# Patient Record
Sex: FEMALE | Race: WHITE | NOT HISPANIC OR LATINO | Employment: UNEMPLOYED | URBAN - METROPOLITAN AREA
[De-identification: names, ages, dates, MRNs, and addresses within clinical notes are randomized per-mention and may not be internally consistent; named-entity substitution may affect disease eponyms.]

---

## 2019-05-07 ENCOUNTER — TRANSCRIBE ORDERS (OUTPATIENT)
Dept: ADMINISTRATIVE | Facility: HOSPITAL | Age: 59
End: 2019-05-07

## 2019-05-07 DIAGNOSIS — G57.52 TARSAL TUNNEL SYNDROME OF LEFT SIDE: Primary | ICD-10-CM

## 2019-05-07 DIAGNOSIS — M72.2 PLANTAR FASCIAL FIBROMATOSIS: ICD-10-CM

## 2019-06-05 ENCOUNTER — HOSPITAL ENCOUNTER (OUTPATIENT)
Dept: NEUROLOGY | Facility: CLINIC | Age: 59
Discharge: HOME | End: 2019-06-05
Payer: COMMERCIAL

## 2019-06-05 DIAGNOSIS — G57.52 TARSAL TUNNEL SYNDROME OF LEFT SIDE: ICD-10-CM

## 2019-06-05 DIAGNOSIS — M72.2 PLANTAR FASCIAL FIBROMATOSIS: ICD-10-CM

## 2019-06-05 PROCEDURE — 95910 NRV CNDJ TEST 7-8 STUDIES: CPT | Performed by: PHYSICAL MEDICINE & REHABILITATION

## 2019-06-05 PROCEDURE — 95886 MUSC TEST DONE W/N TEST COMP: CPT | Performed by: PHYSICAL MEDICINE & REHABILITATION

## 2021-07-21 ENCOUNTER — APPOINTMENT (OUTPATIENT)
Dept: RADIOLOGY | Age: 61
End: 2021-07-21
Payer: MEDICARE

## 2021-07-21 DIAGNOSIS — F43.29 POST-TRAUMA RESPONSE: ICD-10-CM

## 2021-07-21 PROCEDURE — 73030 X-RAY EXAM OF SHOULDER: CPT

## 2022-04-20 ENCOUNTER — APPOINTMENT (OUTPATIENT)
Dept: LAB | Age: 62
End: 2022-04-20
Payer: MEDICARE

## 2022-04-28 RX ORDER — VITAMIN B COMPLEX
1 CAPSULE ORAL DAILY
COMMUNITY

## 2022-04-28 RX ORDER — MULTIVITAMIN
1 TABLET ORAL DAILY
COMMUNITY

## 2022-04-28 RX ORDER — FEXOFENADINE HCL 180 MG/1
180 TABLET ORAL DAILY
COMMUNITY

## 2022-04-28 NOTE — PRE-PROCEDURE INSTRUCTIONS
Pre-Surgery Instructions:   Medication Instructions    Ascorbic Acid (VITAMIN C PO) Stop taking 7 days prior to surgery   azelastine (ASTELIN) 0 1 % nasal spray Uses PRN- OK to take day of surgery    b complex vitamins capsule Stop taking 7 days prior to surgery   Cholecalciferol (Vitamin D3) 125 MCG (5000 UT) CAPS Stop taking 7 days prior to surgery   DULoxetine (CYMBALTA) 60 mg delayed release capsule Take day of surgery   Epinastine HCl 0 05 % ophthalmic solution Uses PRN- OK to take day of surgery    fexofenadine (ALLEGRA) 180 MG tablet Hold day of surgery   ipratropium (ATROVENT) 0 06 % nasal spray Uses PRN- OK to take day of surgery    levothyroxine 50 mcg tablet Take day of surgery   Multiple Vitamin (multivitamin) tablet Stop taking 7 days prior to surgery   zolpidem (AMBIEN) 10 mg tablet Hold day of surgery  Pt instructed to take levothyroxine and cymbalta the morning of surgery with a small sip of water  [facility] preop instructions reviewed with pt  Pt has surgical soap

## 2022-05-02 ENCOUNTER — ANESTHESIA EVENT (OUTPATIENT)
Dept: PERIOP | Facility: HOSPITAL | Age: 62
End: 2022-05-02
Payer: MEDICARE

## 2022-05-03 ENCOUNTER — HOSPITAL ENCOUNTER (OUTPATIENT)
Facility: HOSPITAL | Age: 62
Setting detail: OUTPATIENT SURGERY
Discharge: HOME | End: 2022-05-03
Attending: SURGERY | Admitting: SURGERY
Payer: MEDICARE

## 2022-05-03 ENCOUNTER — ANESTHESIA (OUTPATIENT)
Dept: PERIOP | Facility: HOSPITAL | Age: 62
End: 2022-05-03
Payer: MEDICARE

## 2022-05-03 VITALS
BODY MASS INDEX: 24.43 KG/M2 | RESPIRATION RATE: 18 BRPM | DIASTOLIC BLOOD PRESSURE: 88 MMHG | OXYGEN SATURATION: 92 % | TEMPERATURE: 97 F | HEIGHT: 66 IN | WEIGHT: 152 LBS | SYSTOLIC BLOOD PRESSURE: 131 MMHG | HEART RATE: 75 BPM

## 2022-05-03 PROBLEM — R11.2 PONV (POSTOPERATIVE NAUSEA AND VOMITING): Status: ACTIVE | Noted: 2022-05-03

## 2022-05-03 PROBLEM — Z98.890 PONV (POSTOPERATIVE NAUSEA AND VOMITING): Status: ACTIVE | Noted: 2022-05-03

## 2022-05-03 PROBLEM — F32.A DEPRESSION: Status: ACTIVE | Noted: 2022-05-03

## 2022-05-03 PROBLEM — E03.9 HYPOTHYROIDISM: Status: ACTIVE | Noted: 2022-05-03

## 2022-05-03 PROBLEM — C50.919 BREAST CANCER (HCC): Status: ACTIVE | Noted: 2022-05-03

## 2022-05-03 RX ORDER — CEFAZOLIN SODIUM 1 G/50ML
1000 SOLUTION INTRAVENOUS ONCE
Status: COMPLETED | OUTPATIENT
Start: 2022-05-03 | End: 2022-05-03

## 2022-05-03 RX ORDER — DEXAMETHASONE SODIUM PHOSPHATE 10 MG/ML
INJECTION, SOLUTION INTRAMUSCULAR; INTRAVENOUS AS NEEDED
Status: DISCONTINUED | OUTPATIENT
Start: 2022-05-03 | End: 2022-05-03

## 2022-05-03 RX ORDER — SODIUM CHLORIDE, SODIUM LACTATE, POTASSIUM CHLORIDE, CALCIUM CHLORIDE 600; 310; 30; 20 MG/100ML; MG/100ML; MG/100ML; MG/100ML
100 INJECTION, SOLUTION INTRAVENOUS CONTINUOUS
Status: DISCONTINUED | OUTPATIENT
Start: 2022-05-03 | End: 2022-05-03 | Stop reason: HOSPADM

## 2022-05-03 RX ORDER — LIDOCAINE HYDROCHLORIDE AND EPINEPHRINE 5; 5 MG/ML; UG/ML
INJECTION, SOLUTION INFILTRATION; PERINEURAL AS NEEDED
Status: DISCONTINUED | OUTPATIENT
Start: 2022-05-03 | End: 2022-05-03 | Stop reason: HOSPADM

## 2022-05-03 RX ORDER — ONDANSETRON 2 MG/ML
INJECTION INTRAMUSCULAR; INTRAVENOUS AS NEEDED
Status: DISCONTINUED | OUTPATIENT
Start: 2022-05-03 | End: 2022-05-03

## 2022-05-03 RX ORDER — MAGNESIUM HYDROXIDE 1200 MG/15ML
LIQUID ORAL AS NEEDED
Status: DISCONTINUED | OUTPATIENT
Start: 2022-05-03 | End: 2022-05-03 | Stop reason: HOSPADM

## 2022-05-03 RX ORDER — PROPOFOL 10 MG/ML
INJECTION, EMULSION INTRAVENOUS AS NEEDED
Status: DISCONTINUED | OUTPATIENT
Start: 2022-05-03 | End: 2022-05-03

## 2022-05-03 RX ORDER — MIDAZOLAM HYDROCHLORIDE 2 MG/2ML
INJECTION, SOLUTION INTRAMUSCULAR; INTRAVENOUS AS NEEDED
Status: DISCONTINUED | OUTPATIENT
Start: 2022-05-03 | End: 2022-05-03

## 2022-05-03 RX ORDER — FENTANYL CITRATE/PF 50 MCG/ML
25 SYRINGE (ML) INJECTION
Status: DISCONTINUED | OUTPATIENT
Start: 2022-05-03 | End: 2022-05-03 | Stop reason: HOSPADM

## 2022-05-03 RX ORDER — LIDOCAINE HYDROCHLORIDE 10 MG/ML
0.5 INJECTION, SOLUTION EPIDURAL; INFILTRATION; INTRACAUDAL; PERINEURAL ONCE AS NEEDED
Status: DISCONTINUED | OUTPATIENT
Start: 2022-05-03 | End: 2022-05-03 | Stop reason: HOSPADM

## 2022-05-03 RX ORDER — MINERAL OIL AND PETROLATUM 150; 830 MG/G; MG/G
OINTMENT OPHTHALMIC AS NEEDED
Status: DISCONTINUED | OUTPATIENT
Start: 2022-05-03 | End: 2022-05-03 | Stop reason: HOSPADM

## 2022-05-03 RX ORDER — ACETAMINOPHEN 325 MG/1
650 TABLET ORAL EVERY 6 HOURS PRN
Status: DISCONTINUED | OUTPATIENT
Start: 2022-05-03 | End: 2022-05-03 | Stop reason: HOSPADM

## 2022-05-03 RX ORDER — SODIUM CHLORIDE, SODIUM LACTATE, POTASSIUM CHLORIDE, CALCIUM CHLORIDE 600; 310; 30; 20 MG/100ML; MG/100ML; MG/100ML; MG/100ML
INJECTION, SOLUTION INTRAVENOUS CONTINUOUS PRN
Status: DISCONTINUED | OUTPATIENT
Start: 2022-05-03 | End: 2022-05-03

## 2022-05-03 RX ORDER — ROCURONIUM BROMIDE 10 MG/ML
INJECTION, SOLUTION INTRAVENOUS AS NEEDED
Status: DISCONTINUED | OUTPATIENT
Start: 2022-05-03 | End: 2022-05-03

## 2022-05-03 RX ORDER — FENTANYL CITRATE 50 UG/ML
INJECTION, SOLUTION INTRAMUSCULAR; INTRAVENOUS AS NEEDED
Status: DISCONTINUED | OUTPATIENT
Start: 2022-05-03 | End: 2022-05-03

## 2022-05-03 RX ORDER — NEOMYCIN SULFATE, POLYMYXIN B SULFATE, AND DEXAMETHASONE 3.5; 10000; 1 MG/G; [USP'U]/G; MG/G
OINTMENT OPHTHALMIC AS NEEDED
Status: DISCONTINUED | OUTPATIENT
Start: 2022-05-03 | End: 2022-05-03 | Stop reason: HOSPADM

## 2022-05-03 RX ORDER — ONDANSETRON 2 MG/ML
4 INJECTION INTRAMUSCULAR; INTRAVENOUS ONCE AS NEEDED
Status: DISCONTINUED | OUTPATIENT
Start: 2022-05-03 | End: 2022-05-03 | Stop reason: HOSPADM

## 2022-05-03 RX ADMIN — SODIUM CHLORIDE, SODIUM LACTATE, POTASSIUM CHLORIDE, AND CALCIUM CHLORIDE: .6; .31; .03; .02 INJECTION, SOLUTION INTRAVENOUS at 14:04

## 2022-05-03 RX ADMIN — FENTANYL CITRATE 50 MCG: 50 INJECTION, SOLUTION INTRAMUSCULAR; INTRAVENOUS at 13:54

## 2022-05-03 RX ADMIN — DEXAMETHASONE SODIUM PHOSPHATE 10 MG: 10 INJECTION, SOLUTION INTRAMUSCULAR; INTRAVENOUS at 14:13

## 2022-05-03 RX ADMIN — FENTANYL CITRATE 25 MCG: 50 INJECTION, SOLUTION INTRAMUSCULAR; INTRAVENOUS at 15:21

## 2022-05-03 RX ADMIN — ACETAMINOPHEN 650 MG: 325 TABLET ORAL at 16:01

## 2022-05-03 RX ADMIN — ONDANSETRON 4 MG: 2 INJECTION INTRAMUSCULAR; INTRAVENOUS at 14:13

## 2022-05-03 RX ADMIN — MIDAZOLAM 2 MG: 1 INJECTION INTRAMUSCULAR; INTRAVENOUS at 14:03

## 2022-05-03 RX ADMIN — FENTANYL CITRATE 25 MCG: 50 INJECTION, SOLUTION INTRAMUSCULAR; INTRAVENOUS at 15:26

## 2022-05-03 RX ADMIN — CEFAZOLIN SODIUM 1000 MG: 1 SOLUTION INTRAVENOUS at 14:04

## 2022-05-03 RX ADMIN — FENTANYL CITRATE 50 MCG: 50 INJECTION, SOLUTION INTRAMUSCULAR; INTRAVENOUS at 14:08

## 2022-05-03 RX ADMIN — SUGAMMADEX 200 MG: 100 INJECTION, SOLUTION INTRAVENOUS at 14:55

## 2022-05-03 RX ADMIN — PROPOFOL 200 MG: 10 INJECTION, EMULSION INTRAVENOUS at 14:08

## 2022-05-03 RX ADMIN — ROCURONIUM BROMIDE 40 MG: 10 INJECTION, SOLUTION INTRAVENOUS at 14:08

## 2022-05-03 NOTE — ANESTHESIA POSTPROCEDURE EVALUATION
Post-Op Assessment Note    CV Status:  Stable  Pain Score: 0    Pain management: adequate     Mental Status:  Alert and awake   Hydration Status:  Euvolemic   PONV Controlled:  Controlled   Airway Patency:  Patent      Post Op Vitals Reviewed: Yes            No complications documented      BP   112/70   Temp   100   Pulse  74   Resp   18   SpO2   93

## 2022-05-03 NOTE — DISCHARGE SUMMARY
PLASTIC, RECONSTRUCTIVE, & HAND SURGERY   Discharge Summary  Date of Admission:   5/3/2022  Date of Discharge:   05/03/22  Attending:  Yulissa Villaseñor MD  Principal/Final Diagnosis:   Blepharochalasis right upper eyelid [H02 31]  Blepharochalasis left upper eyelid [H02 34]  Principal Procedure:   BLEPHAROPLASTY UPPER (Bilateral Eye)  Discharge Medications:  See after visit summary for reconciled discharge medications provided to patient and family  Reason for Admission:  Desmond Rogel was electively admitted to undergo the above named procedure on 5/3/2022 as an outpatient  Hospital Course:  Patient underwent the above named procedure on the day of admission without complications  They were discharged home the same day  Disposition:  To home in care of self and family    Condition:  Good  Follow up:  Patient with follow up in the office with Dr Yulissa Villaseñor MD in 1 week(s) or as scheduled per his office  Yulissa Villaseñor MD  5/3/2022 3:01 PM

## 2022-05-03 NOTE — ANESTHESIA PREPROCEDURE EVALUATION
Procedure:  BLEPHAROPLASTY UPPER (Bilateral Eye)    Relevant Problems   ANESTHESIA   (+) PONV (postoperative nausea and vomiting)      ENDO   (+) Hypothyroidism      GYN   (+) Breast cancer (HCC)      NEURO/PSYCH   (+) Depression        Physical Exam    Airway    Mallampati score: I  TM Distance: >3 FB  Neck ROM: full     Dental   Comment: Permanent retainer,     Cardiovascular      Pulmonary      Other Findings        Anesthesia Plan  ASA Score- 2     Anesthesia Type- general with ASA Monitors  Additional Monitors:   Airway Plan: ETT  Plan Factors-Exercise tolerance (METS): >4 METS  Chart reviewed  Patient summary reviewed  Patient is not a current smoker  Patient did not smoke on day of surgery  Induction- intravenous  Postoperative Plan- Plan for postoperative opioid use  Planned trial extubation    Informed Consent- Anesthetic plan and risks discussed with patient  I personally reviewed this patient with the CRNA  Discussed and agreed on the Anesthesia Plan with the CRNA  Radha Herr

## 2022-05-03 NOTE — OP NOTE
OPERATIVE REPORT  PATIENT NAME: Margarito Dotson    :  1960  MRN: [id number]  Pt Location:  OR ROOM 08    SURGERY DATE: 5/3/2022    Surgeon(s) and Role:     * Jacky Avendaño MD - Primary    Preop Diagnosis:  Blepharochalasis right upper eyelid [H02 31]  Blepharochalasis left upper eyelid [H02 34]    Post-Op Diagnosis Codes: * Blepharochalasis right upper eyelid [H02 31]     * Blepharochalasis left upper eyelid [H02 34]    Procedure(s) (LRB):  BLEPHAROPLASTY UPPER (Bilateral)    Specimen(s):  * No specimens in log *    Estimated Blood Loss:   Minimal    Drains:  * No LDAs found *    Anesthesia Type:   General    Operative Indications:  Blepharochalasis right upper eyelid [H02 31]  Blepharochalasis left upper eyelid [H02 34]      Operative Findings:      Complications:   None    Procedure and Technique:  The patient was properly identified in the operating room and placed    in the supine position on the bed  She was prepped and draped in the    sterile surgical fashion  We first started by marking out the eyes with gentian violet and then    went ahead and incised the injected skin with 1% lidocaine with    epinephrine  At this point, we then went ahead and excised the skin    with a #15 blade and electrocautery  We obtained meticulous    hemostasis  We removed a strip of orbicularis oculi muscle along the    tarsal edge  We then went ahead and removed  fat from    the middle and medial fat pads of the upper eyes bilaterally  At this    point, we obtained meticulous hemostasis and closed the upper eyes    with deep 6-0 Vicryl and 6-0 Prolene suture  The  patient tolerated procedure well and was awakened from surgery,    dressed with antibiotic ointment and taken to the recovery room in    stable condition         I was present for the entire procedure    Patient Disposition:  PACU       SIGNATURE: Jacky Avendaño MD  DATE: May 3, 2022  TIME: 2:57 PM

## 2022-05-03 NOTE — INTERVAL H&P NOTE
H&P reviewed  After examining the patient I find no changes in the patients condition since the H&P had been written      Vitals:    05/03/22 1231   BP: 131/83   Pulse: 75   Resp: 20   Temp: 97 7 °F (36 5 °C)   SpO2: 98%

## 2023-12-14 PROBLEM — D72.819 CHRONIC LEUKOPENIA: Status: ACTIVE | Noted: 2023-12-14

## 2023-12-14 PROBLEM — M81.0 SENILE OSTEOPOROSIS: Status: ACTIVE | Noted: 2023-12-14

## 2023-12-14 PROBLEM — G89.4 CHRONIC PAIN SYNDROME: Status: ACTIVE | Noted: 2023-12-14

## 2023-12-14 PROBLEM — G56.01 CARPAL TUNNEL SYNDROME OF RIGHT WRIST: Status: ACTIVE | Noted: 2023-12-14

## 2023-12-14 PROBLEM — N18.2 STAGE 2 CHRONIC KIDNEY DISEASE: Status: ACTIVE | Noted: 2023-12-14

## 2023-12-14 PROBLEM — M35.9 UNDIFFERENTIATED CONNECTIVE TISSUE DISEASE (HCC): Status: ACTIVE | Noted: 2023-12-14

## 2023-12-14 PROBLEM — M15.0 PRIMARY GENERALIZED (OSTEO)ARTHRITIS: Status: ACTIVE | Noted: 2023-12-14

## 2024-01-15 ENCOUNTER — TELEPHONE (OUTPATIENT)
Age: 64
End: 2024-01-15

## 2024-01-15 NOTE — TELEPHONE ENCOUNTER
----- Message from Clau Don sent at 1/12/2024 12:40 PM EST -----    ----- Message -----  From: Radha Guajardo MD  Sent: 12/23/2023  12:06 PM EST  To: Clau Don    Notify the patient that her muscle enzyme is very mildly elevated.  Will put in epic repeat muscle enzyme draw.  She can access it for [facility] but if she wants to go to [facility], we can fax it.

## 2024-01-24 PROBLEM — M77.8 RIGHT ELBOW TENDONITIS: Status: ACTIVE | Noted: 2024-01-24

## 2024-01-24 PROBLEM — G89.29 CHRONIC MIDLINE LOW BACK PAIN WITHOUT SCIATICA: Status: ACTIVE | Noted: 2024-01-24

## 2024-01-24 PROBLEM — M54.50 CHRONIC MIDLINE LOW BACK PAIN WITHOUT SCIATICA: Status: ACTIVE | Noted: 2024-01-24

## 2024-02-10 ENCOUNTER — APPOINTMENT (OUTPATIENT)
Dept: RADIOLOGY | Age: 64
End: 2024-02-10
Payer: MEDICARE

## 2024-02-10 DIAGNOSIS — M54.50 CHRONIC MIDLINE LOW BACK PAIN WITHOUT SCIATICA: ICD-10-CM

## 2024-02-10 DIAGNOSIS — M15.0 PRIMARY GENERALIZED (OSTEO)ARTHRITIS: ICD-10-CM

## 2024-02-10 DIAGNOSIS — G89.29 CHRONIC MIDLINE LOW BACK PAIN WITHOUT SCIATICA: ICD-10-CM

## 2024-02-10 PROCEDURE — 73630 X-RAY EXAM OF FOOT: CPT

## 2024-02-10 PROCEDURE — 73130 X-RAY EXAM OF HAND: CPT

## 2024-02-10 PROCEDURE — 72110 X-RAY EXAM L-2 SPINE 4/>VWS: CPT

## 2024-04-18 ENCOUNTER — TELEPHONE (OUTPATIENT)
Age: 64
End: 2024-04-18

## 2024-04-29 ENCOUNTER — TELEPHONE (OUTPATIENT)
Age: 64
End: 2024-04-29

## 2024-05-20 ENCOUNTER — OFFICE VISIT (OUTPATIENT)
Age: 64
End: 2024-05-20
Payer: MEDICARE

## 2024-05-20 VITALS
HEIGHT: 64 IN | HEART RATE: 68 BPM | WEIGHT: 132.8 LBS | OXYGEN SATURATION: 98 % | TEMPERATURE: 97.7 F | DIASTOLIC BLOOD PRESSURE: 84 MMHG | SYSTOLIC BLOOD PRESSURE: 120 MMHG | BODY MASS INDEX: 22.67 KG/M2

## 2024-05-20 DIAGNOSIS — E03.8 HYPOTHYROIDISM DUE TO HASHIMOTO'S THYROIDITIS: ICD-10-CM

## 2024-05-20 DIAGNOSIS — M15.0 PRIMARY GENERALIZED (OSTEO)ARTHRITIS: ICD-10-CM

## 2024-05-20 DIAGNOSIS — E06.3 HYPOTHYROIDISM DUE TO HASHIMOTO'S THYROIDITIS: ICD-10-CM

## 2024-05-20 DIAGNOSIS — D72.819 CHRONIC LEUKOPENIA: ICD-10-CM

## 2024-05-20 DIAGNOSIS — N18.2 STAGE 2 CHRONIC KIDNEY DISEASE: ICD-10-CM

## 2024-05-20 DIAGNOSIS — G56.01 CARPAL TUNNEL SYNDROME OF RIGHT WRIST: ICD-10-CM

## 2024-05-20 DIAGNOSIS — M81.0 SENILE OSTEOPOROSIS: ICD-10-CM

## 2024-05-20 DIAGNOSIS — M35.9 UNDIFFERENTIATED CONNECTIVE TISSUE DISEASE (HCC): Primary | ICD-10-CM

## 2024-05-20 PROCEDURE — 99214 OFFICE O/P EST MOD 30 MIN: CPT | Performed by: INTERNAL MEDICINE

## 2024-05-20 NOTE — PROGRESS NOTES
Assessment/Plan:    Undifferentiated connective tissue disease (HCC)  Lupus Avise with borderline positive index likely secondary to Sjogren's syndrome with sicca sx's with positive anti-Ro 52 probably secondary to autoimmune thyroid disease with positive thyroglobulin antibody.  No evidence clinically for lupus with negative double-stranded DNA antibody and negative complement activation.  She does have Raynaud's with no evidence for digital ulcerations.  Encourage cold protection for Raynaud's.  Encourage patient to be consistent with the anti-inflammatory diet in view of potential benefit.  She does have a book reference for this.  Follow-up 6 months or sooner if needed.  Continue to monitor patient clinically and with lab work as ordered.    Chronic leukopenia  Chronic leukopenia with no history of recurrent fevers or infections.  Doubt the leukopenia is clinically significant.  The most recent white blood cell count dated 5/10/2024 was 4.9 with absolute neutrophil count 2.6.  Continue to monitor with lab work as ordered.    Primary generalized (osteo)arthritis  Primary generalized osteoarthritis with interphalangeal osteoarthritis hands and feet, right shoulder acromioclavicular osteoarthritis, with lumbar spine films from February of this year totally unremarkable.  Patient is status post right carpal tunnel release and decompression right ulnar neuropathy in March of this year.  No evidence for inflammatory arthropathy.  Encourage home exercise program as tolerated.  Continue to monitor.    Stage 2 chronic kidney disease  Lab Results   Component Value Date    EGFR 65 05/10/2024    EGFR 60 02/08/2024    EGFR 61 12/18/2023    CREATININE 0.98 05/10/2024    CREATININE 1.05 02/08/2024    CREATININE 1.03 12/18/2023   Chronic kidney disease stage II with the most recent estimated GFR 65 dated 5/10/2024.  Continue to monitor particularly in view of meloxicam usage.    Senile osteoporosis  Osteoporosis being treated  with Prolia by hematology oncology, with most recent DEXA from July 2022 significant for osteoporosis with decrease in BMD as compared to the prior study from 2016.  Patient will be due for updated DEXA at the end of July or early August.  I did give her a prescription for this.  Monitor BMD every 2 years.  Continue calcium and vitamin D supplement.  Continue home exercise program as tolerated.  Continue to monitor.    Carpal tunnel syndrome of right wrist  Status post carpal tunnel release and decompression right ulnar nerve March 2024 with success.  Follow-up hand surgeon.    Hypothyroidism  Continue levothyroxine per primary care and will monitor thyroid function studies.  Patient does have evidence of autoimmune thyroid disease with positive thyroglobulin antibody and probable secondary Sjogren's syndrome with chronic sicca symptoms.  Continue symptomatic treatment for chronic dry eyes and dry mouth.  Encourage vigilant dental hygiene and follow-up with dentist every 6 months or sooner if needed, particularly in view of her history of a cracked tooth.  Continue to monitor.         Problem List Items Addressed This Visit       Hypothyroidism     Continue levothyroxine per primary care and will monitor thyroid function studies.  Patient does have evidence of autoimmune thyroid disease with positive thyroglobulin antibody and probable secondary Sjogren's syndrome with chronic sicca symptoms.  Continue symptomatic treatment for chronic dry eyes and dry mouth.  Encourage vigilant dental hygiene and follow-up with dentist every 6 months or sooner if needed, particularly in view of her history of a cracked tooth.  Continue to monitor.         Relevant Orders    DXA bone density spine hip and pelvis    C-reactive protein    CBC and differential    Comprehensive metabolic panel    dsDNA Antibody by IFA, Crithidia luciliae, with Reflex to Titer    C3 complement    C4 complement    Urinalysis with microscopic    Senile  osteoporosis     Osteoporosis being treated with Prolia by hematology oncology, with most recent DEXA from July 2022 significant for osteoporosis with decrease in BMD as compared to the prior study from 2016.  Patient will be due for updated DEXA at the end of July or early August.  I did give her a prescription for this.  Monitor BMD every 2 years.  Continue calcium and vitamin D supplement.  Continue home exercise program as tolerated.  Continue to monitor.         Relevant Orders    DXA bone density spine hip and pelvis    Primary generalized (osteo)arthritis     Primary generalized osteoarthritis with interphalangeal osteoarthritis hands and feet, right shoulder acromioclavicular osteoarthritis, with lumbar spine films from February of this year totally unremarkable.  Patient is status post right carpal tunnel release and decompression right ulnar neuropathy in March of this year.  No evidence for inflammatory arthropathy.  Encourage home exercise program as tolerated.  Continue to monitor.         Carpal tunnel syndrome of right wrist     Status post carpal tunnel release and decompression right ulnar nerve March 2024 with success.  Follow-up hand surgeon.         Undifferentiated connective tissue disease (HCC) - Primary     Lupus Avise with borderline positive index likely secondary to Sjogren's syndrome with sicca sx's with positive anti-Ro 52 probably secondary to autoimmune thyroid disease with positive thyroglobulin antibody.  No evidence clinically for lupus with negative double-stranded DNA antibody and negative complement activation.  She does have Raynaud's with no evidence for digital ulcerations.  Encourage cold protection for Raynaud's.  Encourage patient to be consistent with the anti-inflammatory diet in view of potential benefit.  She does have a book reference for this.  Follow-up 6 months or sooner if needed.  Continue to monitor patient clinically and with lab work as ordered.          Relevant Orders    DXA bone density spine hip and pelvis    C-reactive protein    CBC and differential    Comprehensive metabolic panel    dsDNA Antibody by IFA, Crithidia luciliae, with Reflex to Titer    C3 complement    C4 complement    Urinalysis with microscopic    Chronic leukopenia     Chronic leukopenia with no history of recurrent fevers or infections.  Doubt the leukopenia is clinically significant.  The most recent white blood cell count dated 5/10/2024 was 4.9 with absolute neutrophil count 2.6.  Continue to monitor with lab work as ordered.         Stage 2 chronic kidney disease     Lab Results   Component Value Date    EGFR 65 05/10/2024    EGFR 60 02/08/2024    EGFR 61 12/18/2023    CREATININE 0.98 05/10/2024    CREATININE 1.05 02/08/2024    CREATININE 1.03 12/18/2023   Chronic kidney disease stage II with the most recent estimated GFR 65 dated 5/10/2024.  Continue to monitor particularly in view of meloxicam usage.                Reviewed records, labs, and imaging with the patient in detail.  Counseled patient.  Discussion regarding my findings and recommendations.  Office visit with documentation 35 min.    Subjective:      Patient ID: Koki Gillis is a 63 y.o. female.    63-year-old female with osteoporosis, who recently received her 4th dose of Prolia 5/14/2024 per oncology, with history of stage I triple negative breast cancer treated with bilateral mastectomies and chemotherapy.  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  She is followed by oncology for surveillance of breast cancer, and has history of chronic leukopenia.  She has history of osteoarthritis with joint complaints in shoulders, knees, feet, neck for approximately 5 years.  She complains of foot arthralgias with walking.  She has chronic low back pain nonradicular in nature.  She has had right elbow arthralgias and had an MRI dated 4/4/2023 was significant for mild to moderate biceps tendinosis with  subacute to chronic partial tears in addition to mild tendinosis of the common extensor and flexor origin tendons, and mild triceps tendinosis.  She has morning stiffness of minutes duration with no joint swelling.  She does complain of arthralgias in particular DIPs of left hand and first CMC of right hand.  She states that she sleeps okay, however, is not refreshed in the morning and notes low energy. She has persistent fatigue. She denies true 2 color Raynaud's phenomenon but does note cold sensitivity.  She has a dry mouth in the morning and gives history of a cracked tooth/dental decay.  She does have seasonal allergies treated symptomatically.  She had tried the anti-inflammatory diet when she was away but states that she felt worse on the diet, however, she is on the anti-inflammatory diet approximately 10 days ago.  She has history of right carpal tunnel release and decompression right ulnar neuropathy in March of this year. She has history of anxiety and depression.  She does give history of contact dermatitis from grass in [place].  Review of systems is otherwise remarkable for sinus headaches, urinary stress incontinence, and muscle cramps.  Patient states she was diagnosed with Sjogren's based on lab work dated 10/11/2022 with CAM positive in a dilution of 320 in a cytoplasmic pattern with positive SSA antibodies.  Pritchard/RNP, scleroderma antibodies, and double-stranded DNA antibodies all negative.  Rheumatoid factor negative.  Lyme antibody negative.  CRP less than 3.0.     Lab work dated 5/10/2024 significant for white blood cell count 4.9 with absolute neutrophil count 2.6.  Calcium 10.1.  Estimated GFR 65.  Vitamin D 67.  Lupus Avise diagnostic testing dated 12/18/2023 significant for index of +0.2 with CAM positive by IgG and negative by hep 2 antibody.  Anti-Ro 52 antibody positive as well as thyroglobulin antibody.  TPO antibody negative.  Additional lab work dated 12/18/2023 significant for  rheumatoid factor negative.  CCP negative.  Lyme antibody negative.  CK elevated at 305.  CRP 1.2.  White blood cell count 4.1 with absolute neutrophil count 1.7.  Creatinine 1.03 with estimated GFR 61.  Vitamin D 37.  Lab work dated 2/9/2024 showed improvement of CK at 257.  CBC unremarkable.  Glucose 100.  Creatinine 1.05 with estimated GFR 60.  Aldolase normal at 3.8.    X-rays dated 2/10/2024 significant for bilateral hands with osteoarthritis.  Feet mild osteoarthritis left foot.  LS-spine films unremarkable.    DEXA dated 7/28/2022 compared to 2016 significant for spine T-score -1.9 with a decrease of 4.4% as compared to the prior study.  Right total hip T-score -1.5 with a decrease of 6.5% as compared to the prior study.  Femoral neck T-score -2.5 with a decrease of 8% as compared to the prior study.        Allergies  Allergies   Allergen Reactions    Oxycodone Other (See Comments)     generic percocet-rash    Bee Venom Swelling    Hydrocodone Rash    Oxycodone-Acetaminophen Rash       Home Medications    Current Outpatient Medications:     Calcium-Magnesium-Vitamin D (CALCIUM 1200+D3 PO), Take 1,200 mg by mouth daily, Disp: , Rfl:     cetirizine (ZyrTEC) 10 mg tablet, Take 10 mg by mouth daily, Disp: , Rfl:     DULoxetine (CYMBALTA) 60 mg delayed release capsule, Take 60 mg by mouth daily, Disp: , Rfl:     Epinastine HCl 0.05 % ophthalmic solution, Administer 1 drop to both eyes 2 (two) times a day, Disp: 5 mL, Rfl: 5    levothyroxine 75 mcg tablet, Take 75 mcg by mouth daily, Disp: , Rfl:     meloxicam (MOBIC) 15 mg tablet, Take 1 tablet by mouth daily, Disp: , Rfl:     Turmeric (QC Tumeric Complex) 500 MG CAPS, Take by mouth, Disp: , Rfl:     zolpidem (AMBIEN) 10 mg tablet, Take 10 mg by mouth as needed, Disp: , Rfl:     EPINEPHrine (EPIPEN) 0.3 mg/0.3 mL SOAJ, Inject 0.3 mL (0.3 mg total) into a muscle once for 1 dose, Disp: 0.6 each, Rfl: 3    Past Medical History  Past Medical History:   Diagnosis Date     Allergic rhinitis     Arthritis     Cancer (HCC)     breast cancer b/l, skin cancer    Claustrophobia     Depression     Disease of thyroid gland     hypo    Hashimoto's thyroiditis     Infectious viral hepatitis     A    Lupus (systemic lupus erythematosus) (HCC)     PONV (postoperative nausea and vomiting)     Seasonal allergies     Sjogren syndrome, unspecified (HCC)        Past Surgical History   Past Surgical History:   Procedure Laterality Date    BREAST RECONSTRUCTION Bilateral     CARPAL TUNNEL RELEASE Right 03/2024    COLONOSCOPY      FOOT SURGERY Left     HYSTERECTOMY      MASTECTOMY Bilateral 1998/2003    CT BLEPHAROPLASTY UPPER EYELID W/EXCESSIVE SKIN Bilateral 05/03/2022    Procedure: BLEPHAROPLASTY UPPER;  Surgeon: Trino Ridley MD;  Location:  MAIN OR;  Service: Plastics    TENDON REPAIR Left     arm       Family History   Family History   Problem Relation Age of Onset    Asthma Father     Allergic rhinitis Father     Asthma Sister     Allergic rhinitis Mother     Cancer Mother        The following portions of the patient's history were reviewed and updated as appropriate: allergies, current medications, past family history, past medical history, past social history, past surgical history, and problem list.    Review of Systems   Constitutional:  Negative for chills and fever.   HENT:  Positive for postnasal drip. Negative for ear pain and sore throat.         Dry mouth   Eyes:  Negative for pain and visual disturbance.        Occas dry eyes, using eye drops for allergies   Respiratory:  Negative for cough and shortness of breath.    Cardiovascular:  Negative for chest pain and palpitations.   Gastrointestinal:  Negative for abdominal pain and vomiting.   Genitourinary:  Negative for dysuria and hematuria.        Urinary stress incontinence   Musculoskeletal:  Positive for arthralgias, back pain (Non radicular LBP) and myalgias.        Leg cramps   Skin:  Positive for rash (Contact dermatitis  "from grass in [place]). Negative for color change.   Neurological:  Positive for headaches (sinus h/a). Negative for seizures and syncope.   All other systems reviewed and are negative.        Objective:      /84 (BP Location: Right arm, Patient Position: Sitting, Cuff Size: Adult)   Pulse 68   Temp 97.7 °F (36.5 °C) (Temporal)   Ht 5' 3.78\" (1.62 m)   Wt 60.2 kg (132 lb 12.8 oz)   SpO2 98%   BMI 22.95 kg/m²          Physical Exam  Vitals reviewed.   Constitutional:       Appearance: Normal appearance.   HENT:      Head: Normocephalic.      Nose:      Comments: Nose and throat unremarkable.  Eyes:      Extraocular Movements: Extraocular movements intact.      Comments: Scleral injection bilaterally.   Neck:      Comments: Without masses, thyromegaly, lymphadenopathy  Cardiovascular:      Rate and Rhythm: Regular rhythm.   Pulmonary:      Breath sounds: Normal breath sounds.   Abdominal:      Palpations: Abdomen is soft.   Musculoskeletal:      Cervical back: Neck supple.      Comments: Neck mildly decreased lateral flexion with spasm posterior cervical and shoulder muscles.  Shoulders essentially full range of motion.  Elbows full range of motion with tenderness right medial and lateral epicondyles.  No synovitis noted.  Wrists full range of motion.  Tinel's negative bilaterally.  Hands squaring first carpometacarpal joints bilaterally with early Heberden's nodes.  Questionable early Dupuytren's contracture right hand.  No synovitis noted.  Straight leg raising negative bilaterally.  Schober's negative.  Marked spasm lower lumbosacral paraspinals with decreased forward flexion.  No SI joint tenderness appreciated.  Hips full range of motion.  Knees full range of motion with patellofemoral crepitus.  Ankles full range of motion.  Feet rearfoot hyperpronation with midfoot cavus deformities, high instep's, hallux valgus deformities, hammertoe deformities.  No metatarsalgia appreciated.  No synovitis " noted.   Skin:     General: Skin is warm.      Comments: Raynaud's fingers and toes with no appreciable ulcerations.  No dilated nailfold capillary loops.   Neurological:      Comments: Slight decreased sensation right foot stocking distribution.               This note was written in part using the assistance of the [employer] [id number] microphone system. Those portions using this system have been dictated and not read.

## 2024-05-20 NOTE — PATIENT INSTRUCTIONS
Your blood work confirms that you have autoimmune thyroid disease with secondary Sjogren's, with no systemic manifestations.  There is no evidence of lupus.  You do not need systemic medication.  I would recommend that if you have dry eyes that you use Systane eyedrops.  I would also recommend that you see the dentist at least every 6 months, and ask if you need to come more frequently in view of your dry mouth which puts you at risk for dental decay.  Continue the anti-inflammatory diet.  I suspect that you will notice each month that you feel better.  Take your Zyrtec at night because it is a drug that makes you drowsy.  I would also recommend that you start an active exercise program with walking at least 3-4 times weekly.  Follow-up with primary care and hematology.  Continue the Prolia every 6 months.  I gave you a prescription to repeat your bone density for the end of July or early August.

## 2024-05-26 NOTE — ASSESSMENT & PLAN NOTE
Status post carpal tunnel release and decompression right ulnar nerve March 2024 with success.  Follow-up hand surgeon.

## 2024-05-26 NOTE — ASSESSMENT & PLAN NOTE
Continue levothyroxine per primary care and will monitor thyroid function studies.  Patient does have evidence of autoimmune thyroid disease with positive thyroglobulin antibody and probable secondary Sjogren's syndrome with chronic sicca symptoms.  Continue symptomatic treatment for chronic dry eyes and dry mouth.  Encourage vigilant dental hygiene and follow-up with dentist every 6 months or sooner if needed, particularly in view of her history of a cracked tooth.  Continue to monitor.

## 2024-05-26 NOTE — ASSESSMENT & PLAN NOTE
Chronic leukopenia with no history of recurrent fevers or infections.  Doubt the leukopenia is clinically significant.  The most recent white blood cell count dated 5/10/2024 was 4.9 with absolute neutrophil count 2.6.  Continue to monitor with lab work as ordered.

## 2024-05-26 NOTE — ASSESSMENT & PLAN NOTE
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  Patient is status post right carpal tunnel release and decompression right ulnar neuropathy in March of this year.  No evidence for inflammatory arthropathy.  Encourage home exercise program as tolerated.  Continue to monitor.

## 2024-05-26 NOTE — ASSESSMENT & PLAN NOTE
Lupus Avise with borderline positive index likely secondary to Sjogren's syndrome with sicca sx's with positive anti-Ro 52 probably secondary to autoimmune thyroid disease with positive thyroglobulin antibody.  No evidence clinically for lupus with negative double-stranded DNA antibody and negative complement activation.  She does have Raynaud's with no evidence for digital ulcerations.  Encourage cold protection for Raynaud's.  Encourage patient to be consistent with the anti-inflammatory diet in view of potential benefit.  She does have a book reference for this.  Follow-up 6 months or sooner if needed.  Continue to monitor patient clinically and with lab work as ordered.

## 2024-05-26 NOTE — ASSESSMENT & PLAN NOTE
Osteoporosis being treated with Prolia by hematology oncology, with most recent DEXA from July 2022 significant for osteoporosis with decrease in BMD as compared to the prior study from 2016.  Patient will be due for updated DEXA at the end of July or early August.  I did give her a prescription for this.  Monitor BMD every 2 years.  Continue calcium and vitamin D supplement.  Continue home exercise program as tolerated.  Continue to monitor.

## 2024-08-01 ENCOUNTER — HOSPITAL ENCOUNTER (OUTPATIENT)
Dept: RADIOLOGY | Facility: IMAGING CENTER | Age: 64
End: 2024-08-01
Payer: MEDICARE

## 2024-08-01 DIAGNOSIS — M35.9 UNDIFFERENTIATED CONNECTIVE TISSUE DISEASE (HCC): ICD-10-CM

## 2024-08-01 DIAGNOSIS — E03.8 HYPOTHYROIDISM DUE TO HASHIMOTO'S THYROIDITIS: ICD-10-CM

## 2024-08-01 DIAGNOSIS — M81.0 SENILE OSTEOPOROSIS: ICD-10-CM

## 2024-08-01 DIAGNOSIS — E06.3 HYPOTHYROIDISM DUE TO HASHIMOTO'S THYROIDITIS: ICD-10-CM

## 2024-08-01 PROCEDURE — 77080 DXA BONE DENSITY AXIAL: CPT

## 2024-10-04 ENCOUNTER — TELEPHONE (OUTPATIENT)
Age: 64
End: 2024-10-04

## 2024-10-04 NOTE — TELEPHONE ENCOUNTER
Pt calling to get Dexa Scan results from August 2024. She is aware that unable to compare the results due to different machinery however, understand to remain on Prolia until her follow on on November 25, 2024. No further question at this time

## 2024-12-16 LAB
ALBUMIN SERPL-MCNC: 4.3 G/DL (ref 3.5–5.7)
ALP SERPL-CCNC: 52 U/L (ref 35–120)
ALT SERPL-CCNC: 14 U/L
ANION GAP SERPL CALCULATED.3IONS-SCNC: 7 MMOL/L (ref 3–11)
AST SERPL-CCNC: 22 U/L
BACTERIA URNS QL MICRO: ABNORMAL
BASOPHILS # BLD AUTO: 0 THOU/CMM (ref 0–0.1)
BASOPHILS NFR BLD AUTO: 1 %
BILIRUB SERPL-MCNC: 0.6 MG/DL (ref 0.2–1)
BUN SERPL-MCNC: 19 MG/DL (ref 7–25)
C3 SERPL-MCNC: 109 MG/DL (ref 87–200)
C4 SERPL-MCNC: 26.3 MG/DL (ref 19–52)
CALCIUM SERPL-MCNC: 9.5 MG/DL (ref 8.5–10.5)
CAOX CRY #/AREA URNS HPF: PRESENT /[HPF]
CHLORIDE SERPL-SCNC: 101 MMOL/L (ref 100–109)
CO2 SERPL-SCNC: 30 MMOL/L (ref 21–31)
CREAT SERPL-MCNC: 1.08 MG/DL (ref 0.4–1.1)
CRP SERPL-MCNC: 1.1 MG/L
CYTOLOGY CMNT CVX/VAG CYTO-IMP: ABNORMAL
DIFFERENTIAL METHOD BLD: NORMAL
EOSINOPHIL # BLD AUTO: 0.1 THOU/CMM (ref 0–0.5)
EOSINOPHIL NFR BLD AUTO: 2 %
ERYTHROCYTE [DISTWIDTH] IN BLOOD BY AUTOMATED COUNT: 13.5 % (ref 12–16)
GFR/BSA.PRED SERPLBLD CYS-BASED-ARV: 58 ML/MIN/{1.73_M2}
GLUCOSE SERPL-MCNC: 88 MG/DL (ref 65–99)
GLUCOSE UR QL STRIP: NEGATIVE MG/DL
HCT VFR BLD AUTO: 38.3 % (ref 35–43)
HGB BLD-MCNC: 12.8 G/DL (ref 11.5–14.5)
HGB UR QL STRIP: NEGATIVE MG/DL
KETONES UR QL STRIP: NEGATIVE MG/DL
LEUKOCYTE ESTERASE UR QL STRIP: ABNORMAL /UL
LYMPHOCYTES # BLD AUTO: 1.5 THOU/CMM (ref 1–3)
LYMPHOCYTES NFR BLD AUTO: 35 %
MCH RBC QN AUTO: 31.4 PG (ref 26–34)
MCHC RBC AUTO-ENTMCNC: 33.5 G/DL (ref 32–37)
MCV RBC AUTO: 94 FL (ref 80–100)
MONOCYTES # BLD AUTO: 0.4 THOU/CMM (ref 0.3–1)
MONOCYTES NFR BLD AUTO: 9 %
NEUTROPHILS # BLD AUTO: 2.2 THOU/CMM (ref 1.8–7.8)
NEUTROPHILS NFR BLD AUTO: 53 %
NITRITE UR QL STRIP: NEGATIVE
PH UR: 8 [PH] (ref 4.5–8)
PLATELET # BLD AUTO: 229 THOU/CMM (ref 140–350)
PMV BLD REES-ECKER: 8.3 FL (ref 7.5–11.3)
POTASSIUM SERPL-SCNC: 4.5 MMOL/L (ref 3.5–5.2)
PROT 24H UR-MRATE: NEGATIVE MG/DL
PROT SERPL-MCNC: 6.9 G/DL (ref 6.3–8.3)
RBC # BLD AUTO: 4.09 MILL/CMM (ref 3.7–4.7)
RBC #/AREA URNS HPF: ABNORMAL /HPF (ref 0–2)
SL AMB POCT URINE COMMENT: ABNORMAL
SODIUM SERPL-SCNC: 138 MMOL/L (ref 135–145)
SP GR UR: 1.01 (ref 1–1.03)
SQUAMOUS #/AREA URNS HPF: ABNORMAL /LPF (ref 0–5)
WBC # BLD AUTO: 4.2 THOU/CMM (ref 4–10)
WBC #/AREA URNS HPF: >100 /HPF (ref 0–5)

## 2024-12-17 DIAGNOSIS — Z00.6 ENCOUNTER FOR EXAMINATION FOR NORMAL COMPARISON OR CONTROL IN CLINICAL RESEARCH PROGRAM: ICD-10-CM

## 2024-12-17 LAB — DSDNA IGG SERPL IA-ACNC: NEGATIVE TITER

## 2024-12-22 ENCOUNTER — RESULTS FOLLOW-UP (OUTPATIENT)
Age: 64
End: 2024-12-22

## 2024-12-23 ENCOUNTER — OFFICE VISIT (OUTPATIENT)
Age: 64
End: 2024-12-23
Payer: MEDICARE

## 2024-12-23 VITALS
HEIGHT: 64 IN | OXYGEN SATURATION: 98 % | HEART RATE: 77 BPM | TEMPERATURE: 98.5 F | BODY MASS INDEX: 23.73 KG/M2 | SYSTOLIC BLOOD PRESSURE: 128 MMHG | DIASTOLIC BLOOD PRESSURE: 80 MMHG | WEIGHT: 139 LBS

## 2024-12-23 DIAGNOSIS — M15.0 PRIMARY GENERALIZED (OSTEO)ARTHRITIS: ICD-10-CM

## 2024-12-23 DIAGNOSIS — R82.81 PYURIA: ICD-10-CM

## 2024-12-23 DIAGNOSIS — D72.819 CHRONIC LEUKOPENIA: ICD-10-CM

## 2024-12-23 DIAGNOSIS — M35.9 UNDIFFERENTIATED CONNECTIVE TISSUE DISEASE (HCC): Primary | ICD-10-CM

## 2024-12-23 DIAGNOSIS — C50.911 MALIGNANT NEOPLASM OF BOTH BREASTS IN FEMALE, ESTROGEN RECEPTOR NEGATIVE, UNSPECIFIED SITE OF BREAST (HCC): ICD-10-CM

## 2024-12-23 DIAGNOSIS — Z79.60 LONG-TERM USE OF IMMUNOSUPPRESSANT MEDICATION: ICD-10-CM

## 2024-12-23 DIAGNOSIS — E06.3 HYPOTHYROIDISM DUE TO HASHIMOTO'S THYROIDITIS: ICD-10-CM

## 2024-12-23 DIAGNOSIS — M81.0 SENILE OSTEOPOROSIS: ICD-10-CM

## 2024-12-23 DIAGNOSIS — E06.3 HYPOTHYROIDISM DUE TO HASHIMOTO THYROIDITIS: ICD-10-CM

## 2024-12-23 DIAGNOSIS — Z17.1 MALIGNANT NEOPLASM OF BOTH BREASTS IN FEMALE, ESTROGEN RECEPTOR NEGATIVE, UNSPECIFIED SITE OF BREAST (HCC): ICD-10-CM

## 2024-12-23 DIAGNOSIS — C50.912 MALIGNANT NEOPLASM OF BOTH BREASTS IN FEMALE, ESTROGEN RECEPTOR NEGATIVE, UNSPECIFIED SITE OF BREAST (HCC): ICD-10-CM

## 2024-12-23 DIAGNOSIS — N18.2 STAGE 2 CHRONIC KIDNEY DISEASE: ICD-10-CM

## 2024-12-23 DIAGNOSIS — R49.0 DYSPHONIA: ICD-10-CM

## 2024-12-23 PROCEDURE — 99214 OFFICE O/P EST MOD 30 MIN: CPT | Performed by: INTERNAL MEDICINE

## 2024-12-23 RX ORDER — MELOXICAM 7.5 MG/1
TABLET ORAL
Qty: 90 TABLET | Refills: 1 | Status: SHIPPED | OUTPATIENT
Start: 2024-12-23

## 2024-12-23 NOTE — PATIENT INSTRUCTIONS
Decrease the meloxicam to 7.5 mg and try and take that only 4 or 5 days a week.  Supplement with arthritis strength Tylenol 2 tablets every 12 hours.  For your mouth I would suggest to use Biotene toothpaste.  You can also use Biotene spray which does help with the dryness.  I would ask the ENT doctor about why he thinks that you feel you have excessive saliva when you have Sjogren's.  Continue Systane eye drops.  Continue vigilant dental hygiene and follow-up with a dentist every 6 months or sooner if needed.  Continue the anti-inflammatory diet.  Hopefully speech therapy will help with your hoarseness.  Get the lab work ordered 2 weeks before your next office visit.  You should ask the family doctor to order stool samples for workup of your loose stools if they persist.  Keep up with the anti-inflammatory diet.

## 2024-12-23 NOTE — PROGRESS NOTES
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  Likely secondary Sjogren syndrome. Patient does have Raynaud's with no evidence for digital ulcerations.  She does have sicca symptoms.  More recently she notes excessive saliva.  Encourage patient to have vigilant dental hygiene and follow-up with dentist every 6 months or sooner if needed.  She will continue with Systane eyedrops which have been of benefit for her.  Encourage cold protection for Raynaud's phenomenon.  I would encourage the patient to try and be consistent with the anti-inflammatory diet in view of potential benefit.  She does have a book reference for this.  I suspect it would be of benefit for her probable IBS.  I did encourage her to speak with the primary care physician to get an evaluation for loose stools to include stool cultures and potentially GI consult if symptoms persist.  Plan follow-up in 6 months or sooner with lab work prior to the office visit to monitor for disease activity and medication toxicity.  Continue to monitor.    Hypothyroidism due to Hashimoto's thyroiditis  Hypothyroidism due to Hashimoto's thyroiditis with positive thyroid antibodies, who continues on levothyroxine.  Probable secondary Sjogren's syndrome from autoimmune thyroid disease.  Continue to treat sicca symptoms symptomatically.  Monitor thyroid function studies per primary care.  Continue to monitor.    Hypothyroidism  Continue levothyroxine per primary care and followup with primary care to monitor thyroid function studies.  Probable autoimmune thyroid disease with positive thyroglobulin antibody and probable secondary Sjogren's syndrome with chronic sicca symptoms.  Continue symptomatic treatment for chronic dry eyes and dry mouth.   Encourage vigilant dental hygiene and follow-up with dentist every 6 months or sooner if needed, particularly in view of her history of a cracked tooth.  Continue to monitor.    Chronic leukopenia  Chronic leukopenia with no history of recurrent fevers or infections. Suspect the leukopenia is clinically insignificant.  The most recent white blood cell count dated 12/16/2024 significant for white blood cell count 4.09 with absolute neutrophil count 2.2. Continue to monitor with lab work as ordered.    Breast cancer (HCC)  History stage I triple negative breast cancer treated with bilateral mastectomies and chemotherapy.  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  Continue follow-up with oncology for surveillance of breast cancer.  Additionally she has chronic leukopenia likely clinically insignificant.  Follow-up with oncology as scheduled.    Primary generalized (osteo)arthritis  Primary generalized osteoarthritis with interphalangeal osteoarthritis hands and feet, right shoulder acromioclavicular osteoarthritis, with lumbar spine films from February 2024 unremarkable.  Patient is status post right carpal tunnel release and decompression right ulnar neuropathy in March of this year.  No evidence for inflammatory arthropathy.  Encourage home exercise program as tolerated.  Continue to monitor.    Senile osteoporosis  Patient continues on Prolia for osteoporosis, administered by hematology oncology office, with most recent DEXA from 8/1/2024 significant for spine T-score -1.7.  Left total hip T-score -1.9 with femoral neck T-score -2.3.  Right total hip T-score -1.3 with femoral neck T-score -2.2.  Comparison could not be done with the prior DEXA as it was done at a different facility.  Continue Prolia every 6 months.  Continue calcium and vitamin D supplement and home exercise program as tolerated.  Monitor BMD every 2 years.    Stage 2 chronic kidney disease  Lab Results   Component  Value Date    EGFR 58 (L) 12/16/2024    EGFR 55 (L) 11/14/2024    EGFR 67 07/13/2024    CREATININE 1.08 12/16/2024    CREATININE 1.12 (H) 11/14/2024    CREATININE 0.96 07/13/2024   Estimated GFR trending a bit downwards, questionably related to chronic meloxicam therapy.  I told her that I would give her a prescription for lower dose meloxicam 7.5 mg to be used more than 4-5 times weekly.  I told her she could add arthritis strength Tylenol 1-2 tabs every 12 hours if needed as well.  Avoid nephrotoxic agents like nonsteroidals.  If GFR goes down after decreasing meloxicam dose, would recommend discontinuation and follow-up evaluation with nephrology as indicated.  Continue to monitor.    Dysphonia  Hoarseness evaluated by ENT with laryngoscopy consistent with glottic insufficiency.  Patient is to have speech therapy which may be of benefit for her symptoms.  She has had excessive saliva despite chronic dry mouth.  I have asked her to discuss this with the ENT physician.  Continue to monitor.    Pyuria  Pyuria with some complaints of pressure urinary stress incontinence.  Will refer for urine culture to rule out less likely UTI.  Continue to monitor.         Problem List Items Addressed This Visit     Hypothyroidism    Continue levothyroxine per primary care and followup with primary care to monitor thyroid function studies.  Probable autoimmune thyroid disease with positive thyroglobulin antibody and probable secondary Sjogren's syndrome with chronic sicca symptoms.  Continue symptomatic treatment for chronic dry eyes and dry mouth.  Encourage vigilant dental hygiene and follow-up with dentist every 6 months or sooner if needed, particularly in view of her history of a cracked tooth.  Continue to monitor.         Breast cancer (HCC)    History stage I triple negative breast cancer treated with bilateral mastectomies and chemotherapy.  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  Continue  follow-up with oncology for surveillance of breast cancer.  Additionally she has chronic leukopenia likely clinically insignificant.  Follow-up with oncology as scheduled.         Senile osteoporosis    Patient continues on Prolia for osteoporosis, administered by hematology oncology office, with most recent DEXA from 8/1/2024 significant for spine T-score -1.7.  Left total hip T-score -1.9 with femoral neck T-score -2.3.  Right total hip T-score -1.3 with femoral neck T-score -2.2.  Comparison could not be done with the prior DEXA as it was done at a different facility.  Continue Prolia every 6 months.  Continue calcium and vitamin D supplement and home exercise program as tolerated.  Monitor BMD every 2 years.         Primary generalized (osteo)arthritis    Primary generalized osteoarthritis with interphalangeal osteoarthritis hands and feet, right shoulder acromioclavicular osteoarthritis, with lumbar spine films from February 2024 unremarkable.  Patient is status post right carpal tunnel release and decompression right ulnar neuropathy in March of this year.  No evidence for inflammatory arthropathy.  Encourage home exercise program as tolerated.  Continue to monitor.         Relevant Medications    meloxicam (Mobic) 7.5 mg tablet    Undifferentiated connective tissue disease (HCC) - Primary    Undifferentiated connective tissue disease with borderline positive index on lupus Avise, with positive anti-Ro 52 antibody likely secondary to autoimmune thyroid disease with positive thyroglobulin antibody.  Likely secondary Sjogren syndrome. Patient does have Raynaud's with no evidence for digital ulcerations.  She does have sicca symptoms.  More recently she notes excessive saliva.  Encourage patient to have vigilant dental hygiene and follow-up with dentist every 6 months or sooner if needed.  She will continue with Systane eyedrops which have been of benefit for her.  Encourage cold protection for Raynaud's  phenomenon.  I would encourage the patient to try and be consistent with the anti-inflammatory diet in view of potential benefit.  She does have a book reference for this.  I suspect it would be of benefit for her probable IBS.  I did encourage her to speak with the primary care physician to get an evaluation for loose stools to include stool cultures and potentially GI consult if symptoms persist.  Plan follow-up in 6 months or sooner with lab work prior to the office visit to monitor for disease activity and medication toxicity.  Continue to monitor.         Relevant Orders    C-reactive protein    CBC and differential    Comprehensive metabolic panel    dsDNA Antibody by IFA, Dipak hoffmann, with Reflex to Titer    C3 complement    C4 complement    Urinalysis with microscopic    Chronic leukopenia    Chronic leukopenia with no history of recurrent fevers or infections. Suspect the leukopenia is clinically insignificant.  The most recent white blood cell count dated 12/16/2024 significant for white blood cell count 4.09 with absolute neutrophil count 2.2. Continue to monitor with lab work as ordered.         Relevant Medications    meloxicam (Mobic) 7.5 mg tablet    Stage 2 chronic kidney disease    Lab Results   Component Value Date    EGFR 58 (L) 12/16/2024    EGFR 55 (L) 11/14/2024    EGFR 67 07/13/2024    CREATININE 1.08 12/16/2024    CREATININE 1.12 (H) 11/14/2024    CREATININE 0.96 07/13/2024   Estimated GFR trending a bit downwards, questionably related to chronic meloxicam therapy.  I told her that I would give her a prescription for lower dose meloxicam 7.5 mg to be used more than 4-5 times weekly.  I told her she could add arthritis strength Tylenol 1-2 tabs every 12 hours if needed as well.  Avoid nephrotoxic agents like nonsteroidals.  If GFR goes down after decreasing meloxicam dose, would recommend discontinuation and follow-up evaluation with nephrology as indicated.  Continue to monitor.          Hypothyroidism due to Hashimoto's thyroiditis    Hypothyroidism due to Hashimoto's thyroiditis with positive thyroid antibodies, who continues on levothyroxine.  Probable secondary Sjogren's syndrome from autoimmune thyroid disease.  Continue to treat sicca symptoms symptomatically.  Monitor thyroid function studies per primary care.  Continue to monitor.         Relevant Medications    meloxicam (Mobic) 7.5 mg tablet    Dysphonia    Hoarseness evaluated by ENT with laryngoscopy consistent with glottic insufficiency.  Patient is to have speech therapy which may be of benefit for her symptoms.  She has had excessive saliva despite chronic dry mouth.  I have asked her to discuss this with the ENT physician.  Continue to monitor.         Pyuria    Pyuria with some complaints of pressure urinary stress incontinence.  Will refer for urine culture to rule out less likely UTI.  Continue to monitor.         Relevant Orders    Urine culture   Other Visit Diagnoses       Long-term use of immunosuppressant medication                    Reviewed records, labs, and imaging with the patient in detail.  Counseled patient.  Discussion regarding my findings and recommendations.  Office visit with documentation 35 min.    Subjective:      Patient ID: Koki Gillis is a 64 y.o. female.    Patient with osteoporosis, who recently received her 5th dose of Prolia 11/19/2024 per oncology, with history of stage I triple negative breast cancer treated with bilateral mastectomies and chemotherapy.  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  She is followed by oncology for surveillance of breast cancer, and has history of chronic leukopenia.  She has history of osteoarthritis with joint complaints in shoulders, knees, feet, neck for approximately 5 years.  She noted foot arthralgias with walking.  She has chronic low back pain nonradicular in nature.  She has had right elbow arthralgias and had an MRI dated 4/4/2023  was significant for mild to moderate biceps tendinosis with subacute to chronic partial tears in addition to mild tendinosis of the common extensor and flexor origin tendons, and mild triceps tendinosis.  Joint symptoms have overall improved however she still does complain of fatigue.  She has been using meloxicam 15 mg daily with benefit.  She has morning stiffness of minutes duration with no joint swelling.  Hand arthralgias have improved on meloxicam, particularly in DIPs of left hand and first CMC of right hand.  She states that she sleeps okay, however, is not refreshed in the morning and notes low energy. She has persistent fatigue. She denies true 2 color Raynaud's phenomenon but does note cold sensitivity.  She has a dry mouth in the morning and gives history of a cracked tooth/dental decay.  She does have seasonal allergies treated symptomatically.  She had restarted the anti-inflammatory diet and has noted benefit.  She also has discovered that she is intolerant to dairy and notes more irritable bowel symptoms if she has dairy.  At present she has complained of loose stools since coming back from [facility] in mid October.  Symptoms have improved a bit, but she still does have loose stools on a daily basis.  She has not had any workup for this.  She has history of right carpal tunnel release and decompression right ulnar neuropathy in March of this year. She has history of anxiety and depression.  She does give history of contact dermatitis from grass in [place].  Review of systems is otherwise remarkable for sinus headaches, urinary stress incontinence, and muscle cramps.  More recently she notes increased dry mouth, however, she has had which she describes as excessive saliva.  She has not discussed this with ENT who has been seeing her for dysphonia with diagnosis of glottic insufficiency.  She has been referred for speech therapy by ENT.  Patient states she was diagnosed with Sjogren's based on lab work  dated 10/11/2022 with CAM positive in a dilution of 320 in a cytoplasmic pattern with positive SSA antibodies.  Pritchard/RNP, scleroderma antibodies, and double-stranded DNA antibodies all negative.  Rheumatoid factor negative.  Lyme antibody negative.  CRP less than 3.0.     Lab work dated 12/16/2024 significant for white blood cell count 4.09 with absolute neutrophil count 2.2.  Anti-double-stranded DNA antibodies negative.  C3 and C4 complements normal.  White blood cell count 4.09 with absolute neutrophil count 2.2.  CRP 1.1.  Estimated GFR slightly low at 58.  Urinalysis with pyuria with no true dysuria, however, she does note pressure type symptoms and some stress incontinence.  Review of lab work dated 5/10/2024 significant for white blood cell count 4.9 with absolute neutrophil count 2.6.  Calcium 10.1.  Estimated GFR 65.  Vitamin D 67.  Lupus Avise diagnostic testing dated 12/18/2023 significant for index of +0.2 with CAM positive by IgG and negative by hep 2 antibody.  Anti-Ro 52 antibody positive as well as thyroglobulin antibody.  TPO antibody negative.  Additional lab work dated 12/18/2023 significant for rheumatoid factor negative.  CCP negative.  Lyme antibody negative.  CK elevated at 305.  CRP 1.2.  White blood cell count 4.1 with absolute neutrophil count 1.7.  Creatinine 1.03 with estimated GFR 61.  Vitamin D 37.  Lab work dated 2/9/2024 showed improvement of CK at 257.  CBC unremarkable.  Glucose 100.  Creatinine 1.05 with estimated GFR 60.  Aldolase normal at 3.8.    X-rays dated 2/10/2024 significant for bilateral hands with osteoarthritis.  Feet mild osteoarthritis left foot.  LS-spine films unremarkable.    DEXA dated 8/1/2024 significant for spine T-score -1.7.  Left total hip T-score -1.9.  Left femoral neck T-score -2.3.  Right total hip T-score -1.3 with femoral neck T-score -2.2.  The DEXA was unable to be compared to prior DEXA as it was done at a different facility.  Review of prior DEXA  dated 7/28/2022 compared to 2016 significant for spine T-score -1.9 with a decrease of 4.4% as compared to the prior study.  Right total hip T-score -1.5 with a decrease of 6.5% as compared to the prior study.  Femoral neck T-score -2.5 with a decrease of 8% as compared to the prior study.        Allergies  Allergies   Allergen Reactions   • Oxycodone Other (See Comments)     generic percocet-rash   • Bee Venom Swelling   • Hydrocodone Rash   • Oxycodone-Acetaminophen Rash       Home Medications    Current Outpatient Medications:   •  Calcium-Magnesium-Vitamin D (CALCIUM 1200+D3 PO), Take 1,200 mg by mouth daily, Disp: , Rfl:   •  Epinastine HCl 0.05 % ophthalmic solution, Administer 1 drop to both eyes 2 (two) times a day (Patient taking differently: Administer 1 drop to both eyes as needed), Disp: 5 mL, Rfl: 5  •  levothyroxine 75 mcg tablet, Take 75 mcg by mouth daily, Disp: , Rfl:   •  meloxicam (Mobic) 7.5 mg tablet, Take 1 tablet daily with food no more than 4 or 5 times weekly., Disp: 90 tablet, Rfl: 1  •  zolpidem (AMBIEN) 10 mg tablet, Take 10 mg by mouth as needed, Disp: , Rfl:   •  cetirizine (ZyrTEC) 10 mg tablet, Take 10 mg by mouth daily (Patient not taking: Reported on 12/23/2024), Disp: , Rfl:   •  DULoxetine (CYMBALTA) 60 mg delayed release capsule, Take 60 mg by mouth daily (Patient not taking: Reported on 12/23/2024), Disp: , Rfl:   •  EPINEPHrine (EPIPEN) 0.3 mg/0.3 mL SOAJ, Inject 0.3 mL (0.3 mg total) into a muscle once for 1 dose, Disp: 0.6 each, Rfl: 3  •  Turmeric (QC Tumeric Complex) 500 MG CAPS, Take by mouth (Patient not taking: Reported on 12/23/2024), Disp: , Rfl:     Past Medical History  Past Medical History:   Diagnosis Date   • Allergic rhinitis    • Arthritis    • Cancer (HCC)     breast cancer b/l, skin cancer   • Claustrophobia    • Depression    • Disease of thyroid gland     hypo   • Hashimoto's thyroiditis    • Infectious viral hepatitis     A   • Lupus (systemic lupus  erythematosus) (HCC)    • PONV (postoperative nausea and vomiting)    • Seasonal allergies    • Sjogren syndrome, unspecified (HCC)        Past Surgical History   Past Surgical History:   Procedure Laterality Date   • BREAST RECONSTRUCTION Bilateral    • CARPAL TUNNEL RELEASE Right 03/2024   • COLONOSCOPY     • FOOT SURGERY Left    • HYSTERECTOMY     • MASTECTOMY Bilateral 1998/2003   • OK BLEPHAROPLASTY UPPER EYELID W/EXCESSIVE SKIN Bilateral 05/03/2022    Procedure: BLEPHAROPLASTY UPPER;  Surgeon: Trino Ridley MD;  Location:  MAIN OR;  Service: Plastics   • TENDON REPAIR Left     arm       Family History   Family History   Problem Relation Age of Onset   • Asthma Father    • Allergic rhinitis Father    • Asthma Sister    • Allergic rhinitis Mother    • Cancer Mother        The following portions of the patient's history were reviewed and updated as appropriate: allergies, current medications, past family history, past medical history, past social history, past surgical history, and problem list.    Review of Systems   Constitutional:  Negative for chills and fever.   HENT:  Positive for postnasal drip and voice change (Dysphonia with ENT evaluation significant for glottic insufficiency.  She has been referred for speech therapy.). Negative for ear pain and sore throat.         Dry mouth   Eyes:  Negative for pain and visual disturbance.        Occas dry eyes, using eye drops for allergies   Respiratory:  Negative for cough and shortness of breath.    Cardiovascular:  Negative for chest pain and palpitations.   Gastrointestinal:  Positive for diarrhea (Loose stools since returning from [facility] in mid October.  No testing has been done.). Negative for abdominal pain and vomiting.   Genitourinary:  Negative for dysuria and hematuria.        Urinary stress incontinence   Musculoskeletal:  Positive for arthralgias, back pain (Non radicular LBP) and myalgias.        Leg cramps   Skin:  Positive for rash (Contact  "dermatitis from grass in [place] resolved). Negative for color change.   Neurological:  Positive for headaches (sinus h/a). Negative for seizures and syncope.   All other systems reviewed and are negative.        Objective:      /80   Pulse 77   Temp 98.5 °F (36.9 °C) (Tympanic)   Ht 5' 4\" (1.626 m)   Wt 63 kg (139 lb)   SpO2 98%   BMI 23.86 kg/m²          Physical Exam  Vitals reviewed.   Constitutional:       Appearance: Normal appearance.   HENT:      Head: Normocephalic.      Nose:      Comments: Nose and throat unremarkable.  Eyes:      Extraocular Movements: Extraocular movements intact.      Comments: Scleral injection bilaterally.   Neck:      Comments: Without masses, thyromegaly, lymphadenopathy  Cardiovascular:      Rate and Rhythm: Regular rhythm.   Pulmonary:      Breath sounds: Normal breath sounds.   Abdominal:      Palpations: Abdomen is soft.   Musculoskeletal:      Cervical back: Neck supple.      Comments: Neck mildly decreased lateral flexion with spasm posterior cervical and shoulder muscles.  Shoulders essentially full range of motion.  Elbows full range of motion with tenderness right medial and lateral epicondyles.  No synovitis noted.  Wrists full range of motion.  Tinel's negative bilaterally.  Hands squaring first carpometacarpal joints bilaterally with early Heberden's nodes.  Questionable early Dupuytren's contracture right hand.  No synovitis noted.  Straight leg raising negative bilaterally.  Schober's negative.  Marked spasm lower lumbosacral paraspinals with decreased forward flexion.  No SI joint tenderness appreciated.  Hips full range of motion.  Knees full range of motion with patellofemoral crepitus.  Ankles full range of motion.  Feet rearfoot hyperpronation with midfoot cavus deformities, high instep's, hallux valgus deformities, hammertoe deformities.  No metatarsalgia appreciated.  No synovitis noted.   Skin:     General: Skin is warm.      Comments: " Raynaud's fingers and toes with no appreciable ulcerations.  No dilated nailfold capillary loops.   Neurological:      Comments: Slight decreased sensation right foot stocking distribution.               This note was written in part using the assistance of the [email or web address] Direct [id number] microphone system. Those portions using this system have been dictated and not read.

## 2024-12-24 ENCOUNTER — TELEPHONE (OUTPATIENT)
Age: 64
End: 2024-12-24

## 2024-12-24 LAB
ALBUMIN SERPL-MCNC: 4.3 G/DL (ref 3.5–5.7)
ALP SERPL-CCNC: 67 U/L (ref 35–120)
ALT SERPL-CCNC: 19 U/L
ANION GAP SERPL CALCULATED.3IONS-SCNC: 9 MMOL/L (ref 3–11)
AST SERPL-CCNC: 24 U/L
BACTERIA URNS QL MICRO: ABNORMAL
BASOPHILS # BLD AUTO: 0 THOU/CMM (ref 0–0.1)
BASOPHILS NFR BLD AUTO: 1 %
BILIRUB SERPL-MCNC: 0.4 MG/DL (ref 0.2–1)
BUN SERPL-MCNC: 19 MG/DL (ref 7–25)
C3 SERPL-MCNC: 101 MG/DL (ref 87–200)
C4 SERPL-MCNC: 26.4 MG/DL (ref 19–52)
CALCIUM SERPL-MCNC: 9.2 MG/DL (ref 8.5–10.5)
CHLORIDE SERPL-SCNC: 105 MMOL/L (ref 100–109)
CO2 SERPL-SCNC: 26 MMOL/L (ref 21–31)
CREAT SERPL-MCNC: 1 MG/DL (ref 0.4–1.1)
CRP SERPL-MCNC: <1 MG/L
CYTOLOGY CMNT CVX/VAG CYTO-IMP: NORMAL
DIFFERENTIAL METHOD BLD: NORMAL
DSDNA IGG SERPL IA-ACNC: NEGATIVE TITER
EOSINOPHIL # BLD AUTO: 0.1 THOU/CMM (ref 0–0.5)
EOSINOPHIL NFR BLD AUTO: 3 %
ERYTHROCYTE [DISTWIDTH] IN BLOOD BY AUTOMATED COUNT: 13.3 % (ref 12–16)
GFR/BSA.PRED SERPLBLD CYS-BASED-ARV: 63 ML/MIN/{1.73_M2}
GLUCOSE SERPL-MCNC: 84 MG/DL (ref 65–99)
GLUCOSE UR QL STRIP: NEGATIVE MG/DL
HCT VFR BLD AUTO: 38.6 % (ref 35–43)
HGB BLD-MCNC: 12.9 G/DL (ref 11.5–14.5)
HGB UR QL STRIP: NEGATIVE MG/DL
KETONES UR QL STRIP: NEGATIVE MG/DL
LEUKOCYTE ESTERASE UR QL STRIP: ABNORMAL /UL
LYMPHOCYTES # BLD AUTO: 2 THOU/CMM (ref 1–3)
LYMPHOCYTES NFR BLD AUTO: 42 %
MCH RBC QN AUTO: 30.9 PG (ref 26–34)
MCHC RBC AUTO-ENTMCNC: 33.3 G/DL (ref 32–37)
MCV RBC AUTO: 93 FL (ref 80–100)
MONOCYTES # BLD AUTO: 0.4 THOU/CMM (ref 0.3–1)
MONOCYTES NFR BLD AUTO: 9 %
NEUTROPHILS # BLD AUTO: 2.2 THOU/CMM (ref 1.8–7.8)
NEUTROPHILS NFR BLD AUTO: 45 %
NITRITE UR QL STRIP: NEGATIVE
PH UR: 7 [PH] (ref 4.5–8)
PLATELET # BLD AUTO: 203 THOU/CMM (ref 140–350)
PMV BLD REES-ECKER: 8 FL (ref 7.5–11.3)
POTASSIUM SERPL-SCNC: 4.3 MMOL/L (ref 3.5–5.2)
PROT 24H UR-MRATE: NEGATIVE MG/DL
PROT SERPL-MCNC: 6.5 G/DL (ref 6.3–8.3)
RBC # BLD AUTO: 4.16 MILL/CMM (ref 3.7–4.7)
RBC #/AREA URNS HPF: ABNORMAL /HPF (ref 0–2)
SL AMB POCT URINE COMMENT: ABNORMAL
SODIUM SERPL-SCNC: 140 MMOL/L (ref 135–145)
SP GR UR: 1.01 (ref 1–1.03)
WBC # BLD AUTO: 4.8 THOU/CMM (ref 4–10)
WBC #/AREA URNS HPF: ABNORMAL /HPF (ref 0–5)

## 2024-12-24 NOTE — TELEPHONE ENCOUNTER
Patient returning call for results, advised UA ordered. Patient asking for [facility] labs to receive scripts so she can have completed. Please fax labs and UA scripts to [phone].

## 2024-12-24 NOTE — ASSESSMENT & PLAN NOTE
Pyuria with some complaints of pressure urinary stress incontinence.  Will refer for urine culture to rule out less likely UTI.  Continue to monitor.

## 2024-12-24 NOTE — ASSESSMENT & PLAN NOTE
History stage I triple negative breast cancer treated with bilateral mastectomies and chemotherapy.  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  Continue follow-up with oncology for surveillance of breast cancer.  Additionally she has chronic leukopenia likely clinically insignificant.  Follow-up with oncology as scheduled.

## 2024-12-24 NOTE — TELEPHONE ENCOUNTER
----- Message from Radha Guajardo MD sent at 12/23/2024 10:43 PM EST -----  Notify the patient to go for the urine culture I put in epic, tomorrow.  Prescription was sent to her lab of preference.  Additionally I put in for a new order for lower dose meloxicam 7.5 mg which she should take no more than 4-5 times weekly, however, she can add arthritis strength Tylenol 1 to 2 tablets every 12 hours as needed.

## 2024-12-24 NOTE — TELEPHONE ENCOUNTER
Patient is calling back to see if the scripts were sent to \[facility]". Patient would need the Urinalysis and urine culture scripts faxed over.      Please advise.

## 2024-12-24 NOTE — ASSESSMENT & PLAN NOTE
Lab Results   Component Value Date    EGFR 58 (L) 12/16/2024    EGFR 55 (L) 11/14/2024    EGFR 67 07/13/2024    CREATININE 1.08 12/16/2024    CREATININE 1.12 (H) 11/14/2024    CREATININE 0.96 07/13/2024   Estimated GFR trending a bit downwards, questionably related to chronic meloxicam therapy.  I told her that I would give her a prescription for lower dose meloxicam 7.5 mg to be used more than 4-5 times weekly.  I told her she could add arthritis strength Tylenol 1-2 tabs every 12 hours if needed as well.  Avoid nephrotoxic agents like nonsteroidals.  If GFR goes down after decreasing meloxicam dose, would recommend discontinuation and follow-up evaluation with nephrology as indicated.  Continue to monitor.

## 2024-12-24 NOTE — TELEPHONE ENCOUNTER
Call #1  _____________________________    Attempted call x2 - Poor connection sounded like in car, disconnected.  Attempt #3 went to voicemail - LM to please call back.

## 2024-12-24 NOTE — ASSESSMENT & PLAN NOTE
Patient continues on Prolia for osteoporosis, administered by hematology oncology office, with most recent DEXA from 8/1/2024 significant for spine T-score -1.7.  Left total hip T-score -1.9 with femoral neck T-score -2.3.  Right total hip T-score -1.3 with femoral neck T-score -2.2.  Comparison could not be done with the prior DEXA as it was done at a different facility.  Continue Prolia every 6 months.  Continue calcium and vitamin D supplement and home exercise program as tolerated.  Monitor BMD every 2 years.

## 2024-12-24 NOTE — ASSESSMENT & PLAN NOTE
Hypothyroidism due to Hashimoto's thyroiditis with positive thyroid antibodies, who continues on levothyroxine.  Probable secondary Sjogren's syndrome from autoimmune thyroid disease.  Continue to treat sicca symptoms symptomatically.  Monitor thyroid function studies per primary care.  Continue to monitor.

## 2024-12-24 NOTE — ASSESSMENT & PLAN NOTE
Chronic leukopenia with no history of recurrent fevers or infections. Suspect the leukopenia is clinically insignificant.  The most recent white blood cell count dated 12/16/2024 significant for white blood cell count 4.09 with absolute neutrophil count 2.2. Continue to monitor with lab work as ordered.

## 2024-12-24 NOTE — ASSESSMENT & PLAN NOTE
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  Likely secondary Sjogren syndrome. Patient does have Raynaud's with no evidence for digital ulcerations.  She does have sicca symptoms.  More recently she notes excessive saliva.  Encourage patient to have vigilant dental hygiene and follow-up with dentist every 6 months or sooner if needed.  She will continue with Systane eyedrops which have been of benefit for her.  Encourage cold protection for Raynaud's phenomenon.  I would encourage the patient to try and be consistent with the anti-inflammatory diet in view of potential benefit.  She does have a book reference for this.  I suspect it would be of benefit for her probable IBS.  I did encourage her to speak with the primary care physician to get an evaluation for loose stools to include stool cultures and potentially GI consult if symptoms persist.  Plan follow-up in 6 months or sooner with lab work prior to the office visit to monitor for disease activity and medication toxicity.  Continue to monitor.

## 2024-12-24 NOTE — ASSESSMENT & PLAN NOTE
Hoarseness evaluated by ENT with laryngoscopy consistent with glottic insufficiency.  Patient is to have speech therapy which may be of benefit for her symptoms.  She has had excessive saliva despite chronic dry mouth.  I have asked her to discuss this with the ENT physician.  Continue to monitor.

## 2024-12-24 NOTE — ASSESSMENT & PLAN NOTE
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  Patient is status post right carpal tunnel release and decompression right ulnar neuropathy in March of this year.  No evidence for inflammatory arthropathy.  Encourage home exercise program as tolerated.  Continue to monitor.

## 2024-12-24 NOTE — ASSESSMENT & PLAN NOTE
Continue levothyroxine per primary care and followup with primary care to monitor thyroid function studies.  Probable autoimmune thyroid disease with positive thyroglobulin antibody and probable secondary Sjogren's syndrome with chronic sicca symptoms.  Continue symptomatic treatment for chronic dry eyes and dry mouth.  Encourage vigilant dental hygiene and follow-up with dentist every 6 months or sooner if needed, particularly in view of her history of a cracked tooth.  Continue to monitor.

## 2024-12-26 LAB — LEGIONELLA SPEC CULT: NORMAL

## 2024-12-28 ENCOUNTER — RESULTS FOLLOW-UP (OUTPATIENT)
Age: 64
End: 2024-12-28

## 2024-12-28 DIAGNOSIS — N30.00 ACUTE CYSTITIS WITHOUT HEMATURIA: Primary | ICD-10-CM

## 2024-12-29 RX ORDER — NITROFURANTOIN 25; 75 MG/1; MG/1
100 CAPSULE ORAL 2 TIMES DAILY
Qty: 14 CAPSULE | Refills: 0 | Status: SHIPPED | OUTPATIENT
Start: 2024-12-28

## 2024-12-29 NOTE — RESULT ENCOUNTER NOTE
Ina,  Your urine culture is positive for a urinary tract infection.  I will send an antibiotic to the pharmacy listed on your chart.  You can start that right away.  Feel better!  Radha Guajardo

## 2025-07-15 ENCOUNTER — TELEPHONE (OUTPATIENT)
Age: 65
End: 2025-07-15

## 2025-08-06 ENCOUNTER — EVALUATION (OUTPATIENT)
Dept: PHYSICAL THERAPY | Facility: REHABILITATION | Age: 65
End: 2025-08-06
Attending: INTERNAL MEDICINE
Payer: MEDICARE

## 2025-08-06 DIAGNOSIS — M62.830 LUMBAR PARASPINAL MUSCLE SPASM: ICD-10-CM

## 2025-08-06 DIAGNOSIS — M77.8 SHOULDER TENDINITIS, RIGHT: ICD-10-CM

## 2025-08-06 PROCEDURE — 97110 THERAPEUTIC EXERCISES: CPT | Performed by: PHYSICAL THERAPIST

## 2025-08-06 PROCEDURE — 97161 PT EVAL LOW COMPLEX 20 MIN: CPT | Performed by: PHYSICAL THERAPIST

## 2025-08-06 PROCEDURE — 97535 SELF CARE MNGMENT TRAINING: CPT | Performed by: PHYSICAL THERAPIST

## 2025-08-13 ENCOUNTER — OFFICE VISIT (OUTPATIENT)
Dept: PHYSICAL THERAPY | Facility: REHABILITATION | Age: 65
End: 2025-08-13
Attending: INTERNAL MEDICINE
Payer: MEDICARE

## 2025-08-18 ENCOUNTER — OFFICE VISIT (OUTPATIENT)
Dept: PHYSICAL THERAPY | Facility: REHABILITATION | Age: 65
End: 2025-08-18
Attending: INTERNAL MEDICINE
Payer: MEDICARE

## 2025-08-18 DIAGNOSIS — M62.830 LUMBAR PARASPINAL MUSCLE SPASM: Primary | ICD-10-CM

## 2025-08-18 DIAGNOSIS — M77.8 SHOULDER TENDINITIS, LEFT: ICD-10-CM

## 2025-08-18 PROCEDURE — 97112 NEUROMUSCULAR REEDUCATION: CPT

## 2025-08-18 PROCEDURE — 97110 THERAPEUTIC EXERCISES: CPT

## 2025-08-21 ENCOUNTER — OFFICE VISIT (OUTPATIENT)
Dept: PHYSICAL THERAPY | Facility: REHABILITATION | Age: 65
End: 2025-08-21
Attending: INTERNAL MEDICINE
Payer: MEDICARE

## 2025-08-21 DIAGNOSIS — M77.8 SHOULDER TENDINITIS, RIGHT: ICD-10-CM

## 2025-08-21 DIAGNOSIS — M62.830 LUMBAR PARASPINAL MUSCLE SPASM: Primary | ICD-10-CM

## 2025-08-21 DIAGNOSIS — M77.8 SHOULDER TENDINITIS, LEFT: ICD-10-CM

## 2025-08-21 PROCEDURE — 97140 MANUAL THERAPY 1/> REGIONS: CPT | Performed by: PHYSICAL THERAPIST

## 2025-08-21 PROCEDURE — 97110 THERAPEUTIC EXERCISES: CPT | Performed by: PHYSICAL THERAPIST

## 2025-08-21 PROCEDURE — 97112 NEUROMUSCULAR REEDUCATION: CPT | Performed by: PHYSICAL THERAPIST

## (undated) DEVICE — DISPOSABLE OR TOWEL: Brand: CARDINAL HEALTH

## (undated) DEVICE — PENCIL ELECTROSURG E-Z CLEAN -0035H

## (undated) DEVICE — ELECTRODE NEEDLE MEGAFINE 2IN E-Z CLEAN MEGADYNE -0118

## (undated) DEVICE — SUT PLAIN 5-0 PC-1 18 IN 1915G

## (undated) DEVICE — COTTON TIP APPLICTOR 2 PK

## (undated) DEVICE — STANDARD SURGICAL GOWN, L: Brand: CONVERTORS

## (undated) DEVICE — TELFA NON-ADHERENT ABSORBENT DRESSING: Brand: TELFA

## (undated) DEVICE — NEEDLE 27 G X 1 1/2

## (undated) DEVICE — STERILE POLYISOPRENE POWDER-FREE SURGICAL GLOVES: Brand: PROTEXIS

## (undated) DEVICE — SKIN MARKER DUAL TIP WITH RULER CAP, FLEXIBLE RULER AND LABELS: Brand: DEVON

## (undated) DEVICE — SYRINGE 30ML LL

## (undated) DEVICE — SCD SEQUENTIAL COMPRESSION COMFORT SLEEVE MEDIUM KNEE LENGTH: Brand: KENDALL SCD

## (undated) DEVICE — GAUZE SPONGES,16 PLY: Brand: CURITY

## (undated) DEVICE — SPONGE 4 X 4 XRAY 16 PLY STRL LF RFD

## (undated) DEVICE — LIGHT GLOVE GREEN

## (undated) DEVICE — 1820 FOAM BLOCK NEEDLE COUNTER: Brand: DEVON

## (undated) DEVICE — TIBURON SPLIT SHEET: Brand: CONVERTORS

## (undated) DEVICE — NEEDLE BLUNT 18 G X 1 1/2IN

## (undated) DEVICE — INTENDED FOR TISSUE SEPARATION, AND OTHER PROCEDURES THAT REQUIRE A SHARP SURGICAL BLADE TO PUNCTURE OR CUT.: Brand: BARD-PARKER ® SAFETYLOCK CARBON RIB-BACK BLADES

## (undated) DEVICE — SUT ETHILON 6-0 P-3 18 IN 1698G

## (undated) DEVICE — SYRINGE 10ML LL

## (undated) DEVICE — BASIC PACK: Brand: CONVERTORS